# Patient Record
(demographics unavailable — no encounter records)

---

## 2017-07-02 NOTE — DIAGNOSTIC IMAGING REPORT
SINGLE VIEW CHEST



CLINICAL HISTORY:  Cough.



FINDINGS: An AP, portable, upright chest radiograph is compared to study dated

9/25/2014. The examination is degraded by portable technique and patient

rotation.  The cardiomediastinal silhouette is unremarkable. The lungs and

pleural spaces are clear. No pneumothorax is seen. The bony thorax is grossly

intact.



IMPRESSION: No active disease in the chest. 







Electronically signed by:  Apolinar Toure M.D.

7/2/2017 3:47 PM



Dictated Date/Time:  7/2/2017 3:46 PM

## 2017-07-02 NOTE — DIAGNOSTIC IMAGING REPORT
CT SCAN OF THE ABDOMEN AND PELVIS WITH IV CONTRAST



CLINICAL HISTORY:   Suprapubic abdominal pain.



COMPARISON STUDY:  Abdominal ultrasound dated 9/24/2008. Pelvic ultrasound dated

7/2/2017.



TECHNIQUE: Following the IV administration of  116 cc of Optiray 320, CT scan of

the abdomen and pelvis is performed from the lung bases to the proximal femora.

Images are reviewed in the axial, sagittal, and coronal planes. IV contrast was

administered without complication. Automated dose control exposure was utilized.

The examination is degraded by large body habitus, and by streak artifact from

the body wall abutting the CT gantry.



CT DOSE: 1195.64 mGy.cm



FINDINGS:



Lung bases: The heart is normal in size and without pericardial effusion. The

lung bases are clear. There is a tiny hiatal hernia.



Liver: The contrast-enhanced liver is enlarged, measuring 23.3 cm in length. The

liver demonstrates diffusely diminished attenuation consistent with hepatic

steatosis. There is no intrahepatic biliary ductal dilatation. The hepatic veins

and portal veins are patent.



Gallbladder: Unremarkable.



Spleen: The spleen is enlarged, measuring 14.2 cm in length.



Pancreas: Unremarkable.



Adrenal glands: A 1.4 cm left adrenal nodule likely represents an adenoma but

cannot be definitively characterized due to the presence of IV contrast. The

right adrenal gland is unremarkable.



Kidneys: The contrast enhanced kidneys are normal in size and without

hydronephrosis. The kidneys enhance symmetrically. A 3 mm nonobstructing left

renal calculus is noted.



Abdominal vasculature: The abdominal aorta is normal in course and caliber.



Bowel: The small bowel and colon are normal in course and caliber. Moderate

fecal retention is noted in the right colon. The appendix is  not identified and

reported surgically absent.



Peritoneum: There is no intraperitoneal free air or abdominal ascites. There is

a tiny fat-containing umbilical hernia.



Lymphadenopathy: None.



Pelvic viscera: The bladder, uterus, and adnexa are normal as visualized noting

an intrauterine device in place. There are small ovarian follicles. Calcified

phleboliths are observed in the pelvis.



Skeletal structures: No lytic or blastic lesions are seen. There is advanced

degenerative disc space narrowing at L5-S1 with a large posterior disc

osteophyte complex.





IMPRESSION:



1. There are no acute infectious or inflammatory findings in the abdomen or

pelvis.



2. Hepatomegaly and hepatic steatosis.



3. Splenomegaly.



4. Small nonobstructing left renal calculus.







Electronically signed by:  Apolinar Toure M.D.

7/2/2017 7:34 PM



Dictated Date/Time:  7/2/2017 7:28 PM

## 2017-07-02 NOTE — EMERGENCY ROOM VISIT NOTE
History


First contact with patient:  15:01


Chief Complaint:  URINARY SYMPTOMS


Stated Complaint:  BLADDER/VAGINAL PAIN,PRESSURE,LEAKAGE


Nursing Triage Summary:  


pt c/o bladder pressure and pain and vag dc





History of Present Illness


The patient is a 42 year old female who presents to the Emergency Room via 

private vehicle with complaints of "bladder/vaginal pain, pressure, leakage".  

The patient states that she has a history of obstetric and gynecological 

complications.  She states that for the past 7 years she has been on the Depo-

Provera shot, and unfortunately had been experiencing a good deal of 

breakthrough bleeding, atrophy and other complications.  She didn't follow up 

with her OB/GYN, and the decision was made to place the patient on Mirena.  

This was placed on 04/25/2017.  Since that time, the patient has been 

experiencing what she describes as urinary incontinence, as well as vaginal 

odor.  There is also vaginal pain.  She points to the inferior suprapubic 

region as the location of pain that she currently rates as a 7/10.  The pain, 

urinary incontinence, odor has been worsening.  She did have a checkup on June 13, and was told that she had a yeast infection and was placed upon Diflucan.  

She is been taking her Diflucan tablet every Wednesday since that time.  She 

feels as though it is not working.  She has not had any intercourse for one 

month.  She is an a monogamous relationship.  There was minimal blood when 

wiping after urination recently.  She denies any fevers, chills.  There is also 

associated minimal cough.  The patient states that today she drank a lot of 

water, and felt that she had to urinate a lot, and upon her arrival only 

urinated a small amount.





Review of Systems


A complete 10-point Review of Systems was discussed with the patient, with 

pertinent positives and negatives listed in the History of Present Illness. All 

remaining Review of Systems questions can be considered negative unless 

otherwise specified.





Past Medical/Surgical History


Medical Problems:


(1) Asthma


(2) Bipolar disorder








Family History


Diabetes, heart disease, high blood pressure, cancer, lung disease, gallbladder 

disease.





Social History


Smoking Status:  Never Smoker


Alcohol Use:  occasionally


Marital Status:  single, in relationship


Housing Status:  lives with family


Occupation Status:  unemployed





Current/Historical Medications


Scheduled


Budesonide/Formoterol Fumarate (Symbicort 160/4.5 Inhaler ), 2 PUFFS INH BID


Bupropion (Wellbutrin-Xl), 300 MG PO DAILY


Bupropion Hcl (Wellbutrin Xl), 1 TAB PO DAILY


Butalbital-Acetaminophen-Caffe (Fioricet), 1-2 CAP PO UD


Cefdinir (Omnicef), 1 CAP PO BID


Cholecalciferol (Vitamin D3), 2,000 UNITS PO DAILY


Cyanocobalamin (Vitamin B-12), 1,000 MCG PO DAILY


Diphenhydramine Hcl (Benadryl), 25 MG PO BID


Duloxetine HCl (Cymbalta), 30 MG PO QAM


Duloxetine Hcl (Cymbalta), 60 MG PO QPM


Fexofenadine-Pseudoephedrine (Allegra-D 24 Hour Allergy), 1 TAB PO QAM


Fluticasone Propionate (Nasal) (Flonase Allergy Relief), 2 SPRAYS LYNN DAILY


Hydroxyzine Hcl (Atarax), 25 MG PO TID


Labetalol Hcl (Labetalol Hcl), 200 MG PO BID


Levonorgestrel (Iud) (Mirena), 1 DOSE IU UD


Meloxicam (Mobic), 15 MG PO DAILY


Metformin HCl (Metformin HCl), 500 MG PO BID


Multivitamin (Multivitamin), 1 TAB PO QPM


Nystatin (Topical) (Nyata), 1 APPLN TOP TID


Pregabalin (Lyrica), 150 MG PO TID


Probiotic Product (Probiotic), PO QPM


Topiramate (Topamax), 300 MG PO BID


Triamcinolone Acet (Triamcinolone Acetonide), 1 APPLN TOP DAILY


Zafirlukast (Accolate), 20 MG PO BID





Scheduled PRN


Acetaminophen (Tylenol), 1,000 MG PO Q4-6 PRN for Pain


Albuterol Hfa (Ventolin Hfa), 2 PUFFS INH Q4 PRN for SOB/Wheezing


Cyclobenzaprine Hcl (Flexeril), 5 MG PO TID PRN for Pain


Olopatadine Hydrochloride (Pataday), 1 DROPS OP DAILY PRN for EYE ALLERGIES





Allergies


Coded Allergies:  


     Banana (Verified  Allergy, Unknown, 11/13/15)


     Celery (Verified  Allergy, Unknown, 11/13/15)


     Macrolides (Verified  Allergy, Unknown, BIAXIN, 11/13/15)


     NUTS (Verified  Allergy, Unknown, 11/13/15)


     Sulfa Antibiotics (Verified  Allergy, Unknown, `, 7/2/17)


     Amoxicillin (Verified  Adverse Reaction, Intermediate, SEVERE STOMACH PAIN

, 7/2/17)


     Doxycycline (Verified  Adverse Reaction, Intermediate, MIGRAINE, IMMEDIATE 

BODYACHE ESPECIALLY BAD IN JOINTS, 7/2/17)


     Clarithromycin (Verified  Adverse Reaction, Mild, GI-UPSET, 11/13/15)


     Levofloxacin (Verified  Adverse Reaction, Mild, MIGRAINE,BAD BODYACHES 

MUSCLE PAIN, 7/2/17)


     Cephalexin (Verified  Adverse Reaction, Unknown, DOESN'T REMEMBER, 7/2/17)


     Prednisone (Verified  Adverse Reaction, Unknown, HARDER TO CONTROL BIPOLAR 

SXS, 11/13/15)





Physical Exam


Vital Signs











  Date Time  Temp Pulse Resp B/P (MAP) Pulse Ox O2 Delivery O2 Flow Rate FiO2


 


7/2/17 21:00 36.9 68 10 124/70 98 Room Air  


 


7/2/17 19:06  79  125/76 98 Room Air  


 


7/2/17 17:39  80 12 121/81 99   


 


7/2/17 14:44 36.9 87 18 121/68 98 Room Air  











Physical Exam


VITAL SIGNS - Vital signs and nursing notes were reviewed.  Patient is afebrile

, normotensive, non-tachycardic and is saturating well on room air 98%.


GENERAL -42-year-old female appearing her stated age who is in no acute 

distress. Communicates well with provider and answers questions appropriately.


SKIN - Without rashes.


HEAD - NC/AT.


EYES - Sclera anicteric. Palpebral conjunctiva pink and moist with no injection 

noted.


LUNGS - Chest wall symmetric without accessory muscle use, intercostals 

retractions, or central cyanosis. Normal vesicular breath sounds CTA B/L. 

minimal wheezes bilaterally, without rales, or rhonchi appreciated.


CARDIAC - RRR with S1/S2. No murmur, rubs, or gallops appreciated. 


ABDOMEN - Abdominal contour without pulsations or visible masses. BS 

normoactive all four quadrants.  There is tenderness to palpation overlying the 

suprapubic region.  No CVA tenderness.  No palpable masses, hepatosplenomegaly, 

or ascites noted.


EXTREMITIES - No clubbing or peripheral cyanosis. No pretibial edema present. ++

5/5 strength noted in UE/LE bilaterally.








PELVIC EXAM: 


The patient's nurse was present to assist with exam, and chaperone.  The 

patient was educated upon what her pelvic exam was, and she was offered to 

decline.  Patient did not decline.  I explained to her the pelvic exam.  The 

patient was prepared and positioned for best examination.  Patient was 

positioned by nurse.  The external genitalia, mons pubis, labia majora, labia 

minora, clitoris, urethral meatus, Bartholin's glands, perineum, and anus were 

within normal limits. Small, 1mm distal, inferior introitis laceration/

abrasion. Minimal bleeding.  The speculum was held then a 45 angle and 

properly lubricated, the speculum was inserted without difficulty to the depth 

of the cervix.  Speculum was then open slowly.  Cervix was identified.  The 

cervix was within normal limits and did not display any purulent discharge nor 

was erythematous. Small black string protrudes which I presume to be the Mirena 

string. At this time 3 samples were taken of the discharge.  These were 

cultured.  The speculum was then closed and removed without difficulty.  I then 

explained to the patient that I was in a perform a bimanual pelvic examination.

  I then introduced the index finger into the vaginal vault, palpated the 

cervix and cervical os and noted no abnormalities.  The uterine body, apex and 

fundus were then palpated and were within normal limits, and position.  The 

ovaries were then palpated with my left hand pressing over the lower quadrants 

of the abdomen and my right index finger pressing in the region of the ovary 

with no abnormal findings but tenderness elicited.   The exam was concluded, 

the nurse felt the patient back to her bed.





Medical Decision & Procedures


ER Provider


Diagnostic Interpretation:


SINGLE VIEW CHEST





CLINICAL HISTORY:  Cough.





FINDINGS: An AP, portable, upright chest radiograph is compared to study dated


9/25/2014. The examination is degraded by portable technique and patient


rotation.  The cardiomediastinal silhouette is unremarkable. The lungs and


pleural spaces are clear. No pneumothorax is seen. The bony thorax is grossly


intact.





IMPRESSION: No active disease in the chest. 











Electronically signed by:  Apolinar Toure M.D.


7/2/2017 3:47 PM





Dictated Date/Time:  7/2/2017 3:46 PM





ULTRASOUND OF THE PELVIS 





CLINICAL HISTORY: Vaginal pain and discharge. Incontinence.





COMPARISON STUDY: MRI of the pelvis dated 1/16/2008.





TECHNIQUE: Real-time, grayscale, and color flow sonography of the pelvis is


performed both transabdominally and endovaginally. Images are reviewed in the


transverse and longitudinal planes.





FINDINGS:





Uterus: The uterus is normal in size and slightly heterogeneous and echotexture,


measuring 9.1 x 3.7 x 5.2 cm.





Endometrium: The endometrium is normal in appearance, and the endometrial stripe


is normal in thickness measuring up to 0.4 cm. An intrauterine device is in


place.





Ovaries: The ovaries are normal in size and morphology. The right ovary measures


2.7 x 1.7 x 2.0 cm and the left ovary measures 2.3 x 1.6 x 1.6 cm. There are


small bilateral ovarian follicles. Normal Doppler waveforms are shown within


both ovaries.





Pelvis: There is no free fluid in the cul-de-sac. No concerning adnexal lesion


is seen.








IMPRESSION: No acute sonographic abnormality is identified in the pelvis noting


an intrauterine device in place.











Electronically signed by:  Apolinar Toure M.D.


7/2/2017 5:49 PM





Dictated Date/Time:  7/2/2017 5:43 PM








CT SCAN OF THE ABDOMEN AND PELVIS WITH IV CONTRAST





CLINICAL HISTORY:   Suprapubic abdominal pain.





COMPARISON STUDY:  Abdominal ultrasound dated 9/24/2008. Pelvic ultrasound dated


7/2/2017.





TECHNIQUE: Following the IV administration of  116 cc of Optiray 320, CT scan of


the abdomen and pelvis is performed from the lung bases to the proximal femora.


Images are reviewed in the axial, sagittal, and coronal planes. IV contrast was


administered without complication. Automated dose control exposure was utilized.


The examination is degraded by large body habitus, and by streak artifact from


the body wall abutting the CT gantry.





CT DOSE: 1195.64 mGy.cm





FINDINGS:





Lung bases: The heart is normal in size and without pericardial effusion. The


lung bases are clear. There is a tiny hiatal hernia.





Liver: The contrast-enhanced liver is enlarged, measuring 23.3 cm in length. The


liver demonstrates diffusely diminished attenuation consistent with hepatic


steatosis. There is no intrahepatic biliary ductal dilatation. The hepatic veins


and portal veins are patent.





Gallbladder: Unremarkable.





Spleen: The spleen is enlarged, measuring 14.2 cm in length.





Pancreas: Unremarkable.





Adrenal glands: A 1.4 cm left adrenal nodule likely represents an adenoma but


cannot be definitively characterized due to the presence of IV contrast. The


right adrenal gland is unremarkable.





Kidneys: The contrast enhanced kidneys are normal in size and without


hydronephrosis. The kidneys enhance symmetrically. A 3 mm nonobstructing left


renal calculus is noted.





Abdominal vasculature: The abdominal aorta is normal in course and caliber.





Bowel: The small bowel and colon are normal in course and caliber. Moderate


fecal retention is noted in the right colon. The appendix is  not identified and


reported surgically absent.





Peritoneum: There is no intraperitoneal free air or abdominal ascites. There is


a tiny fat-containing umbilical hernia.





Lymphadenopathy: None.





Pelvic viscera: The bladder, uterus, and adnexa are normal as visualized noting


an intrauterine device in place. There are small ovarian follicles. Calcified


phleboliths are observed in the pelvis.





Skeletal structures: No lytic or blastic lesions are seen. There is advanced


degenerative disc space narrowing at L5-S1 with a large posterior disc


osteophyte complex.








IMPRESSION:





1. There are no acute infectious or inflammatory findings in the abdomen or


pelvis.





2. Hepatomegaly and hepatic steatosis.





3. Splenomegaly.





4. Small nonobstructing left renal calculus.











Electronically signed by:  Apolinar Toure M.D.


7/2/2017 7:34 PM





Dictated Date/Time:  7/2/2017 7:28 PM





Laboratory Results


7/2/17 15:27








Red Blood Count 4.57, Mean Corpuscular Volume 92.6, Mean Corpuscular Hemoglobin 

30.4, Mean Corpuscular Hemoglobin Concent 32.9, Mean Platelet Volume 9.9, 

Neutrophils (%) (Auto) 60.4, Lymphocytes (%) (Auto) 27.5, Monocytes (%) (Auto) 

7.0, Eosinophils (%) (Auto) 3.8, Basophils (%) (Auto) 0.8, Neutrophils # (Auto) 

5.24, Lymphocytes # (Auto) 2.39, Monocytes # (Auto) 0.61, Eosinophils # (Auto) 

0.33, Basophils # (Auto) 0.07





7/2/17 15:27

















Test


  7/2/17


14:50 7/2/17


15:27 7/2/17


16:00 7/2/17


20:05


 


Urine Pregnancy Test NEG (NEG)    


 


White Blood Count


  


  8.68 K/uL


(4.8-10.8) 


  


 


 


Red Blood Count


  


  4.57 M/uL


(4.2-5.4) 


  


 


 


Hemoglobin


  


  13.9 g/dL


(12.0-16.0) 


  


 


 


Hematocrit  42.3 % (37-47)   


 


Mean Corpuscular Volume


  


  92.6 fL


() 


  


 


 


Mean Corpuscular Hemoglobin


  


  30.4 pg


(25-34) 


  


 


 


Mean Corpuscular Hemoglobin


Concent 


  32.9 g/dl


(32-36) 


  


 


 


Platelet Count


  


  277 K/uL


(130-400) 


  


 


 


Mean Platelet Volume


  


  9.9 fL


(7.4-10.4) 


  


 


 


Neutrophils (%) (Auto)  60.4 %   


 


Lymphocytes (%) (Auto)  27.5 %   


 


Monocytes (%) (Auto)  7.0 %   


 


Eosinophils (%) (Auto)  3.8 %   


 


Basophils (%) (Auto)  0.8 %   


 


Neutrophils # (Auto)


  


  5.24 K/uL


(1.4-6.5) 


  


 


 


Lymphocytes # (Auto)


  


  2.39 K/uL


(1.2-3.4) 


  


 


 


Monocytes # (Auto)


  


  0.61 K/uL


(0.11-0.59) 


  


 


 


Eosinophils # (Auto)


  


  0.33 K/uL


(0-0.5) 


  


 


 


Basophils # (Auto)


  


  0.07 K/uL


(0-0.2) 


  


 


 


RDW Standard Deviation


  


  46.9 fL


(36.4-46.3) 


  


 


 


RDW Coefficient of Variation


  


  13.9 %


(11.5-14.5) 


  


 


 


Immature Granulocyte % (Auto)  0.5 %   


 


Immature Granulocyte # (Auto)


  


  0.04 K/uL


(0.00-0.02) 


  


 


 


Anion Gap


  


  10.0 mmol/L


(3-11) 


  


 


 


Est Creatinine Clear Calc


Drug Dose 


  94.6 ml/min 


  


  


 


 


Estimated GFR (


American) 


  81.5 


  


  


 


 


Estimated GFR (Non-


American 


  70.3 


  


  


 


 


BUN/Creatinine Ratio  16.5 (10-20)   


 


Calcium Level


  


  9.2 mg/dl


(8.5-10.1) 


  


 


 


Total Bilirubin


  


  0.3 mg/dl


(0.2-1) 


  


 


 


Aspartate Amino Transf


(AST/SGOT) 


  22 U/L (15-37) 


  


  


 


 


Alanine Aminotransferase


(ALT/SGPT) 


  45 U/L (12-78) 


  


  


 


 


Alkaline Phosphatase


  


  97 U/L


() 


  


 


 


Total Protein


  


  7.7 gm/dl


(6.4-8.2) 


  


 


 


Albumin


  


  3.7 gm/dl


(3.4-5.0) 


  


 


 


Globulin


  


  4.0 gm/dl


(2.5-4.0) 


  


 


 


Albumin/Globulin Ratio  0.9 (0.9-2)   


 


Urine Color    YELLOW 


 


Urine Appearance    CLEAR (CLEAR) 


 


Urine pH    5.5 (4.5-7.5) 


 


Urine Specific Gravity


  


  


  


  > 1.045


(1.000-1.030)


 


Urine Protein    NEG (NEG) 


 


Urine Glucose (UA)    NEG (NEG) 


 


Urine Ketones    NEG (NEG) 


 


Urine Occult Blood    NEG (NEG) 


 


Urine Nitrite    NEG (NEG) 


 


Urine Bilirubin    NEG (NEG) 


 


Urine Urobilinogen    NEG (NEG) 


 


Urine Leukocyte Esterase    SMALL (NEG) 


 


Urine WBC (Auto)    >30 /hpf (0-5) 


 


Urine RBC (Auto)    0-4 /hpf (0-4) 


 


Urine Hyaline Casts (Auto)    1-5 /lpf (0-5) 


 


Urine Epithelial Cells (Auto)


  


  


  


  10-20 /lpf


(0-5)


 


Urine Bacteria (Auto)    NEG (NEG) 














 Date/Time


Source Procedure


Growth Status


 


 


 7/2/17 16:00


Cervix Swab Trichomonas Preparation - Final Complete











Medications Administered











 Medications


  (Trade)  Dose


 Ordered  Sig/Scott


 Route  Start Time


 Stop Time Status Last Admin


Dose Admin


 


 Acetaminophen


  (Tylenol Tab)  500 mg  NOW  STAT


 PO  7/2/17 15:11


 7/2/17 15:15 DC 7/2/17 15:43


500 MG


 


 Cefdinir


  (Omnicef Cap)  300 mg  ONE  STAT


 PO  7/2/17 21:27


 7/2/17 21:28 DC 7/2/17 21:44


300 MG











Medical Decision


Patient was seen and evaluated as above.  After obtaining a thorough history 

and physical examination IV access was initiated and the above workup was 

performed.  The patient presents to us today with vaginal discharge, odor, 

urinary incontinence that is urged related, and a headache.  She has been on 

Diflucan every Wednesday since seeing her OB/GYN.  It appears that her symptoms 

at this time has stemmed after she had the intrauterine device placed.  Patient'

s CBC reveals no leukocytosis or anemia.  CMP reveals no evidence of kidney or 

liver failure.  Initial urine does reveal contaminates, however repeat urine 

does reveal small leukocyte esterase, greater than 30 white blood cells, 10-20 

epithelial cells.  I will treat this as a UTI after discussing the case with my 

attending.  Exam was performed and described as above.  Culture pending.  Chest 

x-ray was obtained as the patient did have a productive cough and was found to 

be negative.  Pelvic ultrasound was obtained and was found to be negative.  

Abdomen and pelvis CT was then obtained and found to be essentially negative 

except for internal findings.  Patient is a follow-up regarding these findings 

with her family doctor.  She is to follow up with urology regarding her urinary 

symptoms of incontinence, and OB/GYN for vaginal ailments.  She has what 

appears to be urge incontinence, and I do not suspect any neurologic failure.  

Her vital signs are stable.  She was given Tylenol for her pain, and then 

declined any other pain medication.  At this time she appears stable for 

discharge, and the case was discussed with my attending.  Patient declined pain 

medication for home.  Patient was educated upon worrisome symptoms which to 

return, had questions prior to discharge, and was discharged home in good 

condition.





At this time I believe the patient is stable for management in the outpatient 

setting, and do not suspect any emergent etiology of which would require 

hospitalization or surgery. 





In evaluation treatment this patient following differential diagnoses were 

entertained: UTI, STI, IUD infection, displacement, neurologic compromise, 

incontinence, diverticulitis, acute intra-abdominal injury, among others.





Impression





 Primary Impression:  


 Symptoms of urinary tract infection


 Additional Impressions:  


 Hepatic steatosis


 Splenomegaly


 Adrenal nodule


 Renal calculus


 umbilial hernia, fat containing


 Hiatal hernia





Departure Information


Dispostion


Home / Self-Care





Condition


GOOD





Prescriptions





Cefdinir (OMNICEF) 300 Mg Cap


1 CAP PO BID for 7 Days, #14 CAP


   Prov: [place] PA-C         7/2/17





Referrals


Cheo Cox M.D. (PCP)








Travis Vaca M.D.





Patient Instructions


My [facility]





Additional Instructions





You have been treated in the Emergency Department for a Urinary Tract Infection 

(UTI).





You have been prescribed Omnicef to be taken twice daily. This is an 

antibiotic. All antibiotics have the potential to cause diarrhea. Stop this 

medication and contact a medical provider if you were to develop any 

significant adverse side effects including: wheezing, shortness of breath, 

passing out, vomiting, or a diffuse rash. Always take antibiotics as directed 

and COMPLETE the ENTIRE course regardless of the improvement of your symptoms.





Please follow-up with your family doctor, your OB/GYN as well as a urologist.  

The urologist phone number has been listed.





For pain control, you can use the following over-the-counter medicines (if >13 yo):





- Regular strength (325mg/tab) Tylenol (acetaminophen) 2 tabs every 4-6 hours 

as needed. Do not exceed 12 tablets in a 24 hour period. Avoid taking more than 

3 grams (3000 mg) of Tylenol per day. This includes any other sources of 

acetaminophen you may take on a regular basis.





- Regular strength (200 mg/tab) Advil (ibuprofen) 1-2 tabs every 4-6 hours as 

needed. Do not exceed a dose of 3200 mg per day.





Return to the emergency department if your symptoms worsen despite treatment 

course outlined above.





Drink plenty of water and stay well hydrated.





As with any trip to the Emergency Department, you should follow-up with your 

Primary Care Provider from today's visit.





Return to the emergency department if your symptoms persist despite treatment 

plan outlined above or if the following symptoms occur: increased fevers, chills

, low back pain, nausea/vomiting, or blood in your urine.





Please return to emergency department with symptoms.





Problem Qualifiers

## 2017-07-02 NOTE — DIAGNOSTIC IMAGING REPORT
ULTRASOUND OF THE PELVIS 



CLINICAL HISTORY: Vaginal pain and discharge. Incontinence.



COMPARISON STUDY: MRI of the pelvis dated 1/16/2008.



TECHNIQUE: Real-time, grayscale, and color flow sonography of the pelvis is

performed both transabdominally and endovaginally. Images are reviewed in the

transverse and longitudinal planes.



FINDINGS:



Uterus: The uterus is normal in size and slightly heterogeneous and echotexture,

measuring 9.1 x 3.7 x 5.2 cm.



Endometrium: The endometrium is normal in appearance, and the endometrial stripe

is normal in thickness measuring up to 0.4 cm. An intrauterine device is in

place.



Ovaries: The ovaries are normal in size and morphology. The right ovary measures

2.7 x 1.7 x 2.0 cm and the left ovary measures 2.3 x 1.6 x 1.6 cm. There are

small bilateral ovarian follicles. Normal Doppler waveforms are shown within

both ovaries.



Pelvis: There is no free fluid in the cul-de-sac. No concerning adnexal lesion

is seen.





IMPRESSION: No acute sonographic abnormality is identified in the pelvis noting

an intrauterine device in place.







Electronically signed by:  Apolinar Toure M.D.

7/2/2017 5:49 PM



Dictated Date/Time:  7/2/2017 5:43 PM

## 2017-07-03 NOTE — EMERGENCY ROOM VISIT NOTE
ED Visit Note


First contact with patient:  15:01


I have personally seen and evaluated the patient with the PA.  I agree with the 

diagnosis and management decisions and have been personally involved in the 

case. Please see [place] PA-C's notes for further details of the history, 

physical and visit.

## 2017-08-16 NOTE — DIAGNOSTIC IMAGING REPORT
MRI OF THE BRAIN WITHOUT AND WITH IV CONTRAST



CLINICAL HISTORY: GAIT DISTURBANCE,MEMORY LOSS/IMPAIRMENT,HEADACHE    



COMPARISON STUDY:  August 30, 2006 



TECHNIQUE: MRI of the brain was performed from the vertex to the skull base

utilizing various T1 and T2 weighted sequences. Following the IV administration

of 11 mL of Gadavist contrast, additional enhanced images were obtained.



FINDINGS: 

Sagittal T1, axial diffusion, proton density and T2 weighted axial, coronal

FLAIR, and pre and post axial T1-weighted images were acquired. These were

supplemented with post gadolinium coronal T1 weighted images. 

No intra or extra-axial mass lesions are visualized.

Axial diffusion-weighted images reveal no evidence of acute or subacute

infarction.

There is no evidence of ventricular dilatation.

Proton density T2-weighted and FLAIR images reveal no significant

intraparenchymal signal abnormalities.

There are no abnormal flow voids.

There is no evidence of pathologic enhancement.



There are mild inflammatory changes in the right maxillary sinus and moderate

inflammatory changes within the left maxilla sinus.





IMPRESSION:  

1. Bilateral polypoid mucosal thickening within the maxillary sinuses left

greater than right.

2. Otherwise normal MRI of the brain. No evidence of acute or subacute

infarction. No evidence of intracranial mass. No evidence of hydrocephalus.







Electronically signed by:  Brian Valdovinos M.D.

8/16/2017 3:58 PM



Dictated Date/Time:  8/16/2017 3:55 PM

## 2017-08-16 NOTE — DIAGNOSTIC IMAGING REPORT
C-SPINE ROUTINE 4 OR 5 VIEWS



HISTORY: Mental status change  GAIT Disturbance, memory Loss/impairment,

headache



COMPARISON: None.



FINDINGS: The cervical spine is visualized from C1 through the superior endplate

of T1. There is no fracture.  No subluxation. Minimal degenerative disc change

Prevertebral soft tissues and the atlantodens interval are intact.



IMPRESSION:  

Minimal degenerative disc change. Otherwise negative study.











The above report was generated using voice recognition software.  It may contain

grammatical, syntax or spelling errors.







Electronically signed by:  Sanju Corbin M.D.

8/16/2017 3:55 PM



Dictated Date/Time:  8/16/2017 3:55 PM

## 2017-11-19 NOTE — EMERGENCY ROOM VISIT NOTE
History


Report prepared by [place]:  Sara Johnson


Under the Supervision of:  Dr. Josemanuel Cornejo D.O.


First contact with patient:  15:35


Chief Complaint:  RESPIRATORY PROBLEMS


Stated Complaint:  TROUBLE BREATHING, ASTHMATIC





History of Present Illness


The patient is a 42 year old female who presents to the Emergency Room with 

complaints of a worsening cough beginning two days ago. The patient reports her 

cough has been productive with a yellow mucus. She notes her lung hurt, fatigue

, sore throat, and achy muscles. The patient  used an inhaler and Carole-seltzer 

with no relief. The patient started working at a  2 weeks ago.





   Source of History:  patient


   Onset:  2 days ago


   Position:  other (generalized)


   Quality:  other (cough )


   Timing:  worsening


   Associated Symptoms:  + sorethroat, + cough, + fatigue





Review of Systems


See HPI for pertinent positives & negatives. A total of 10 systems reviewed and 

were otherwise negative.





Past Medical & Surgical


Medical Problems:


(1) Asthma


(2) Bipolar disorder








Social History


Smoking Status:  Never Smoker


Alcohol Use:  occasionally


Marital Status:  


Housing Status:  lives with family


Occupation Status:  disabled





Current/Historical Medications


Scheduled


Azithromycin (Zithromax Z-Nathaniel), 0 PO UD


Budesonide/Formoterol Fumarate (Symbicort 160/4.5 Inhaler ), 2 PUFFS INH BID


Bupropion HCl (Bupropion HCl Xl), 300 MG PO DAILY


Bupropion Hcl (Bupropion Hcl Xl), 150 MG PO DAILY


Butalbital-Acetaminophen-Caffe (Fioricet), 1-2 CAP PO UD


Cholecalciferol (Vitamin D3), 2,000 INTER.UNIT PO DAILY


Cyanocobalamin (Vitamin B-12), 1,000 MCG PO DAILY


Diphenhydramine Hcl (Benadryl), 25 MG PO BID


Duloxetine HCl (Duloxetine HCl), 30 MG PO QAM


Duloxetine HCl (Duloxetine HCl), 60 MG PO QAM


Fexofenadine-Pseudoephedrine (Allegra-D 24 Hour Allergy), 1 TAB PO QAM


Fluticasone Propionate (Fluticasone Propionate), 2 SPRAYS LYNN DAILY


Hydroxyzine Hcl (Atarax), 25 MG PO TID


Labetalol HCl (Labetalol HCl), 200 MG PO BID


Levonorgestrel (Iud) (Mirena), 20 MCG IU UD


Meloxicam (Meloxicam), 15 MG PO DAILY


Methylprednisolone (Medrol Dosepak), 1 PKT PO UD


Multivitamin (Multivitamin), 1 TAB PO QPM


Pregabalin (Lyrica), 150 MG PO TID


Probiotic Product (Probiotic), 1 TAB PO QPM


Ranitidine HCl (Ranitidine HCl), 150 MG PO BID


Topiramate (Topamax), 300 MG PO BID


Vitamin E (Vitamin E), 100 INTER.UNIT PO DAILY


Zafirlukast (Zafirlukast), 20 MG PO BID


Zolpidem Tartrate (Zolpidem Tartrate), 10 MG PO HS





Scheduled PRN


Acetaminophen (Tylenol), 1,000 MG PO Q4-6HRS PRN for Pain


Albuterol Hfa (Ventolin Hfa), 2 PUFFS INH Q4H PRN for SOB/Wheezing


Cyclobenzaprine HCl (Cyclobenzaprine HCl), 10 MG PO TID PRN for Muscle Spasm


Nystatin (Topical) (Nyata), 1 APPLN TOP TID PRN for UNDECIDED


Olopatadine HCl (Olopatadine Hydrochloride), 1 DROP OP DAILY PRN for Allergy 

Symptoms


Triamcinolone Acet (Triamcinolone Acetonide), 1 APPLN TOP DAILY PRN for 

UNDECIDED





Allergies


Coded Allergies:  


     Banana (Verified  Allergy, Unknown, 11/13/15)


     Celery (Verified  Allergy, Unknown, 11/13/15)


     Macrolides (Verified  Allergy, Unknown, BIAXIN, 11/13/15)


     NUTS (Verified  Allergy, Unknown, 11/13/15)


     Sulfa Antibiotics (Verified  Allergy, Unknown, `, 7/2/17)


     Amoxicillin (Verified  Adverse Reaction, Intermediate, SEVERE STOMACH PAIN

, 7/2/17)


     Doxycycline (Verified  Adverse Reaction, Intermediate, MIGRAINE, IMMEDIATE 

BODYACHE ESPECIALLY BAD IN JOINTS, 7/2/17)


     Clarithromycin (Verified  Adverse Reaction, Mild, GI-UPSET, 11/13/15)


     Levofloxacin (Verified  Adverse Reaction, Mild, MIGRAINE,BAD BODYACHES 

MUSCLE PAIN, 7/2/17)


     Cephalexin (Verified  Adverse Reaction, Unknown, DOESN'T REMEMBER, 7/2/17)


     Prednisone (Verified  Adverse Reaction, Unknown, HARDER TO CONTROL BIPOLAR 

SXS, 11/13/15)





Physical Exam


Vital Signs











  Date Time  Temp Pulse Resp B/P (MAP) Pulse Ox O2 Delivery O2 Flow Rate FiO2


 


11/19/17 15:54     100 Room Air  


 


11/19/17 15:34 37.0 90 16 128/87 97 Room Air  











Physical Exam


CONSTITUTIONAL/VITAL SIGNS: Reviewed / noted above.


GENERAL: Non-toxic in appearance. 


INTEGUMENTARY: Warm, dry, and Pink.


HEAD: Normocephalic.


EYES: without scleral icterus or trauma.


ENT/OROPHARYNX: hoarseness in voice. clear and moist.


LYMPHADENOPATHY/NECK: Is supple without lymphadenopathy or meningismus.


RESPIRATORY: Minimal scattered wheezes.


CARDIOVASCULAR: Regular rate and rhythm.


GI/ABDOMEN: Soft and nontender. No organomegaly or pulsatile mass. No rebound 

or guarding. Normal bowel sounds.


EXTREMITIES: Warm and well perfused.


BACK: No CVA tenderness.


NEUROLOGICAL: Intact without focal deficits. 


PSYCHIATRIC: normal affect.


MUSCULOSKELETAL: Normally developed with good muscle tone.





Medical Decision & Procedures


Medications Administered











 Medications


  (Trade)  Dose


 Ordered  Sig/Scott


 Route  Start Time


 Stop Time Status Last Admin


Dose Admin


 


 Albuterol/


 Ipratropium


  (Duoneb)  3 ml  NOW  STAT


 INH  11/19/17 15:44


 11/19/17 15:45 DC 11/19/17 15:44


3 ML











ED Course


1540: Previous medical records were reviewed. The patient was evaluated in room 

B8. A complete history and physical examination was performed.





1544: Ordered Duoneb 3 ml INH.





1555: On reevaluation, the patient is resting. I discussed the results and 

findings with the patient. She verbalized agreement of the treatment plan. The 

patient was discharged home.





Medical Decision





Differential includes viral illness, influenza, streptococcal pharyngitis, 

meningitis, pneumonia, sinusitis, UTI, pyelonephritis, otitis media.





This is a 42-year-old female who presents to the ED with a chief complaint of 

hoarseness in her voice, cough and muscle soreness.  She states that she has 

had the symptoms since Friday, 2 days ago.  The patient's vital signs here are 

stable.  She is afebrile.  Her exam reveals some scattered expiratory wheezing 

otherwise good air movement.  Posterior pharynx is clear.  The patient sounds 

hoarse.  The patient was treated with a DuoNeb treatment.  She was felt to be 

stable for discharge.  She will be discharged on Medrol Dosepak and Zithromax.





Blood Pressure Screening


Patient's blood pressure:  Normal blood pressure





Impression





 Primary Impression:  


 Bronchitis





Scribe Attestation


The scribe's documentation has been prepared under my direction and personally 

reviewed by me in its entirety. I confirm that the note above accurately 

reflects all work, treatment, procedures, and medical decision making performed 

by me.





Departure Information


Dispostion


Home / Self-Care





Prescriptions





Methylprednisolone (MEDROL DOSEPAK) 4 Mg Nathaniel


1 PKT PO UD for 6 Days, #1 PKT


   Prov: Josemanuel Cornejo D.O.         11/19/17 


Azithromycin (ZITHROMAX Z-NATHANIEL) 250 Mg Tab


0 PO UD, #1 PKT


   2 TABS DAY 1, THEN 1 TAB DAILY FOR 4 DAYS


   Prov: Josemanuel Cornejo D.O.         11/19/17





Referrals


Cheo Cox M.D. (PCP)





Forms


HOME CARE DOCUMENTATION FORM,                                                 

               IMPORTANT VISIT INFORMATION, WORK / SCHOOL INSTRUCTIONS





Patient Instructions


[facility]





Additional Instructions





Zithromax as prescribed.





Medrol Dosepak as prescribed.





Follow-up with your doctor for further care and evaluation in 1-2 days.  Return 

to the emergency department for worsening or new symptoms or any concerns.


You have been examined and treated today on an emergency basis only. This is 

not a substitute for, or an effort to provide, complete comprehensive medical 

care. It is impossible to recognize and treat all injuries or illnesses in a 

single emergency department visit. It is therefore important that you follow up 

closely with your doctor.  Call as soon as possible for an appointment.

## 2017-11-23 NOTE — EMERGENCY ROOM VISIT NOTE
History


First contact with patient:  14:15


Chief Complaint:  THROAT PAIN/INJURY


Stated Complaint:  NO VOICE,PAIN IN LUNGS,PRODUCTIVE COUGH





History of Present Illness


The patient is a 42 year old female who presents to the Emergency Room with 

complaints of a productive cough that has gotten progressively worse over the 

last week.  The patient was seen in the emergency department a few days ago.  

She was prescribed steroids and a Z-Nathaniel, which she has been taking with no 

relief.  She denies any fever or chills.  She feels extremely weak and mildly 

short of breath with exertion.  The patient has a history of asthma.  She 

reports not having any albuterol at home.  It is .





Review of Systems


10 system review performed and  negative unless noted in HPI or below





Past Medical/Surgical History


Medical Problems:


(1) Asthma


(2) Bipolar disorder








Social History


Smoking Status:  Never Smoker


Alcohol Use:  occasionally


Marital Status:  


Housing Status:  lives with family


Occupation Status:  disabled





Current/Historical Medications


Scheduled


Albuterol Sulf (Albuterol Sulfate), 1 AMP INH Q6H


Azithromycin (Zithromax Z-Nathaniel), 0 PO UD


Budesonide/Formoterol Fumarate (Symbicort 160/4.5 Inhaler ), 2 PUFFS INH BID


Bupropion HCl (Bupropion HCl Xl), 300 MG PO DAILY


Bupropion Hcl (Bupropion Hcl Xl), 150 MG PO DAILY


Butalbital-Acetaminophen-Caffe (Fioricet), 1-2 CAP PO UD


Cholecalciferol (Vitamin D3), 2,000 INTER.UNIT PO DAILY


Cyanocobalamin (Vitamin B-12), 1,000 MCG PO DAILY


Diphenhydramine Hcl (Benadryl), 25 MG PO BID


Duloxetine HCl (Duloxetine HCl), 30 MG PO QAM


Duloxetine HCl (Duloxetine HCl), 60 MG PO QAM


Fexofenadine-Pseudoephedrine (Allegra-D 24 Hour Allergy), 1 TAB PO QAM


Fluticasone Propionate (Fluticasone Propionate), 2 SPRAYS LYNN DAILY


Hydroxyzine Hcl (Atarax), 25 MG PO TID


Labetalol HCl (Labetalol HCl), 200 MG PO BID


Levofloxacin (Levaquin), 1 TAB PO DAILY


Levonorgestrel (Iud) (Mirena), 20 MCG IU UD


Meloxicam (Meloxicam), 15 MG PO DAILY


Methylprednisolone (Medrol Dosepak), 1 PKT PO UD


Multivitamin (Multivitamin), 1 TAB PO QPM


Prednisone (Prednisone), 0 PO DAILY


Pregabalin (Lyrica), 150 MG PO TID


Probiotic Product (Probiotic), 1 TAB PO QPM


Ranitidine HCl (Ranitidine HCl), 150 MG PO BID


Topiramate (Topamax), 300 MG PO BID


Vitamin E (Vitamin E), 100 INTER.UNIT PO DAILY


Zafirlukast (Zafirlukast), 20 MG PO BID


Zolpidem Tartrate (Zolpidem Tartrate), 10 MG PO HS





Scheduled PRN


Albuterol Hfa (Ventolin Hfa), 2 PUFFS INH Q4H PRN for SOB/Wheezing


Cyclobenzaprine HCl (Cyclobenzaprine HCl), 10 MG PO TID PRN for Muscle Spasm


Hydrocodone W/ Homatropine (Hycodan 5/1.5MG 5 Ml), 5-10 ML PO Q4H PRN for Cough


Nystatin (Topical) (Nyata), 1 APPLN TOP TID PRN for UNDECIDED


Olopatadine HCl (Olopatadine Hydrochloride), 1 DROP OP DAILY PRN for Allergy 

Symptoms


Triamcinolone Acet (Triamcinolone Acetonide), 1 APPLN TOP DAILY PRN for 

UNDECIDED





Physical Exam


Vital Signs











  Date Time  Temp Pulse Resp B/P (MAP) Pulse Ox O2 Delivery O2 Flow Rate FiO2


 


17 16:00  84 16 115/79 99 Nebulizer  


 


17 15:35  74 16  97 Room Air  


 


17 14:09 37.1 109 20 124/74 97 Room Air  











Physical Exam


VITALS: Vitals are noted on the nurse's note and reviewed by myself.  Vital 

signs stable.


GENERAL: 42-year-old female, in no acute distress, nondiaphoretic,


SKIN: The skin was without rashes, erythema, edema, or bruising.


HEAD: Normocephalic atraumatic.  


EARS: External auditory canals clear, tympanic membranes pearly gray without 

erythema or effusion bilaterally.


EYES: Conjunctivae without injection, sclerae without icterus.  Extraocular 

movements intact.  


MOUTH: Mucous membranes moist.  Tonsils are not enlarged.  Pharynx without 

erythema or exudate.  Uvula midline.  Airway patent.  Tongue does not deviate.  


NECK: Supple without nuchal rigidity.  Mild lymphadenopathy in the anterior 

cervical chain bilaterally.. Cervical spine is nontender.  No JVD.


HEART: Regular rate and rhythm without murmurs gallops or rubs.


LUNGS: Diffuse wheezing in all lung fields.  No crackles noted.  Positive 

tachypnea.  No accessory muscle use.


ABDOMEN: Positive bowel sounds x 4


MUSCULOSKELETAL: No muscle atrophy, erythema, or edema noted.  Strength 5/5 

throughout.


NEURO: Patient was alert and oriented to person place and time.  Normal 

sensation to touch. No focal neurological deficits.





Medical Decision & Procedures


ER Provider


Diagnostic Interpretation:


CXR





                                                                               

                                                                 


Patient Name: RABIA BE                               Unit Number:  

[id number]                  


 








 











Dictated: 17


 


Transcribed: 17


 


ARG


 


Printed Date/Time: [~ rep prt dt]/[~ rep prt tm]








 [~ rep ct labl] - [~ rep ct ivnm]


 





   [facility]


 Radiology Department


 [place] [id number] [phone]





 











Dictated: 17


 


Transcribed: 17


 


ARG


 


Printed Date/Time: [~ rep prt dt]/[~ rep prt tm]








 [~ rep ct labl] - [~ rep ct ivnm]


 





CHEST 2 VIEWS ROUTINE





CLINICAL HISTORY: Wheezing, productive cough, fatigue.    





COMPARISON STUDY:  2017





FINDINGS: The cardiac and mediastinal contours are normal. There is no evidence


of focal pulmonary consolidation. There is no evidence of failure. No pleural


effusions are visualized.[ 





IMPRESSION: No active disease in the chest.











Electronically signed by:  Brian Valdovinos M.D.


2017 3:23 PM





Dictated Date/Time:  2017 3:22 PM





 The status of this report is Signed.   


 Draft = Not yet reviewed or approved by Radiologist.  


 Signed = Reviewed and approved by Radiologist.


<AttendingPhy></AttendingPhy> <FamilyPhy>Cheo Cox M.D.</FamilyPhy> <

PrimaryPhy>Cheo Cox M.D.</PrimaryPhy> <UnitNumber>[id number]</UnitNumber

> <VisitNumber>[id number]</VisitNumber> <PatientName>RABIA BE</PatientName

> <DateOfBirth>1975</DateOfBirth> <Location>[facility]</Location> <ServiceDate>

17</ServiceDate> <MNE>ESINDI</MNE> <OrderingPhy>JomarChristi SONAL SOLORZANO</

OrderingPhy> <OrderingPhyMNE>f rep ord dr graham</OrderingPhyMNE> <DictatingPhyMNE>

f rep dict dr graham</DictatingPhyMNE> <CCListMNE>f rep ct mne</CCListMNE> <

AdmittingPhyMNE>f pt admit dr graham</AdmittingPhyMNE> <AttendingPhyMNE>f pt 

attend dr graham</AttendingPhyMNE>


<ConsultingPhyMNE>f pt consult dr graham</ConsultingPhyMNE> <FamilyPhyMNE>f pt fam 

dr graham</FamilyPhyMNE> <OtherPhyMNE>f pt other dr graham</OtherPhyMNE> <

PrimaryPhyMNE>f pt prim care dr graham</PrimaryPhyMNE> <ReferringPhyMNE>f pt 

referring dr graham</ReferringPhyMNE>





Laboratory Results


17 14:52








Red Blood Count 4.43, Mean Corpuscular Volume 92.8, Mean Corpuscular Hemoglobin 

30.7, Mean Corpuscular Hemoglobin Concent 33.1, Mean Platelet Volume 9.9, 

Neutrophils (%) (Auto) 71.9, Lymphocytes (%) (Auto) 19.5, Monocytes (%) (Auto) 

6.4, Eosinophils (%) (Auto) 1.1, Basophils (%) (Auto) 0.4, Neutrophils # (Auto) 

9.13, Lymphocytes # (Auto) 2.47, Monocytes # (Auto) 0.81, Eosinophils # (Auto) 

0.14, Basophils # (Auto) 0.05





17 14:52

















Test


  17


14:52 17


15:00


 


White Blood Count


  12.69 K/uL


(4.8-10.8) 


 


 


Red Blood Count


  4.43 M/uL


(4.2-5.4) 


 


 


Hemoglobin


  13.6 g/dL


(12.0-16.0) 


 


 


Hematocrit 41.1 % (37-47)  


 


Mean Corpuscular Volume


  92.8 fL


() 


 


 


Mean Corpuscular Hemoglobin


  30.7 pg


(25-34) 


 


 


Mean Corpuscular Hemoglobin


Concent 33.1 g/dl


(32-36) 


 


 


Platelet Count


  319 K/uL


(130-400) 


 


 


Mean Platelet Volume


  9.9 fL


(7.4-10.4) 


 


 


Neutrophils (%) (Auto) 71.9 %  


 


Lymphocytes (%) (Auto) 19.5 %  


 


Monocytes (%) (Auto) 6.4 %  


 


Eosinophils (%) (Auto) 1.1 %  


 


Basophils (%) (Auto) 0.4 %  


 


Neutrophils # (Auto)


  9.13 K/uL


(1.4-6.5) 


 


 


Lymphocytes # (Auto)


  2.47 K/uL


(1.2-3.4) 


 


 


Monocytes # (Auto)


  0.81 K/uL


(0.11-0.59) 


 


 


Eosinophils # (Auto)


  0.14 K/uL


(0-0.5) 


 


 


Basophils # (Auto)


  0.05 K/uL


(0-0.2) 


 


 


RDW Standard Deviation


  47.3 fL


(36.4-46.3) 


 


 


RDW Coefficient of Variation


  13.9 %


(11.5-14.5) 


 


 


Immature Granulocyte % (Auto) 0.7 %  


 


Immature Granulocyte # (Auto)


  0.09 K/uL


(0.00-0.02) 


 


 


Anion Gap


  8.0 mmol/L


(3-11) 


 


 


Est Creatinine Clear Calc


Drug Dose 89.2 ml/min 


  


 


 


Estimated GFR (


American) 76.7 


  


 


 


Estimated GFR (Non-


American 66.2 


  


 


 


BUN/Creatinine Ratio 15.5 (10-20)  


 


Calcium Level


  9.2 mg/dl


(8.5-10.1) 


 


 


Total Bilirubin


  0.2 mg/dl


(0.2-1) 


 


 


Aspartate Amino Transf


(AST/SGOT) 14 U/L (15-37) 


  


 


 


Alanine Aminotransferase


(ALT/SGPT) 38 U/L (12-78) 


  


 


 


Alkaline Phosphatase


  115 U/L


() 


 


 


Total Protein


  7.7 gm/dl


(6.4-8.2) 


 


 


Albumin


  3.4 gm/dl


(3.4-5.0) 


 


 


Globulin


  4.3 gm/dl


(2.5-4.0) 


 


 


Albumin/Globulin Ratio 0.8 (0.9-2)  


 


Influenza Type A (RT-PCR)


  


  Neg for Influ


A (NEG)


 


Influenza Type B (RT-PCR)


  


  Neg for Influ


B (NEG)











Medications Administered











 Medications


  (Trade)  Dose


 Ordered  Sig/Scott


 Route  Start Time


 Stop Time Status Last Admin


Dose Admin


 


 Albuterol/


 Ipratropium


  (Duoneb)  12 ml  ONE  ONCE


 INH  17 14:30


 17 14:35 DC 17 14:30


12 ML


 


 Methylprednisolone


 Sodium Succinate


  (Solu-Medrol IV)  125 mg  NOW  STAT


 IV  17 14:30


 17 14:35 DC 17 14:30


125 MG


 


 Levofloxacin


  (Levaquin Tab)  750 mg  STK-MED ONCE


 .ROUTE  17 14:50


 17 14:51 DC 17 14:50


750 MG











ED Course


Patient was seen and examined


Vital signs including  blood pressure were reviewed


medications list was verified with patient


Labs were obtained, and a saline lock was established


The patient was given an hour-long DuoNeb treatment.  She was given Solu-Medrol 

125 mg IV.


Imaging was performed


Upon reevaluation, the patient was resting comfortably.  We discussed the 

results of her workup.  She voiced understanding.


I reviewed discharge instructions the patient.  They voiced understanding and 

had no further questions.





Medical Decision


Differential diagnosis: Bronchitis, pneumonia, strep pharyngitis, viral 

pharyngitis, influenza , asthma exacerbation





This patient is a 42-year-old female presents emergency department with a 

productive cough and difficulty breathing.  Patient has a history of asthma.  

She is currently being treated with a Z-Nathaniel and steroids with no improvement.  

On exam, the patient was diffusely wheezing.  Her workup reveals mild 

leukocytosis.  There is no pneumonia noted on the x-ray.  The patient was given 

an hour-long DuoNeb treatment.  Breathing improved.  Her influenza swab is 

negative.  Although I believe this is likely viral, I will cover the patient 

with Levaquin to prevent pneumonia.  The patient also was recommended that she 

continue steroids and nebulizer treatments every 6 hours.  Patient is not 

hypoxic.  I believe she is stable to be discharged home with close follow-up.  

She was encouraged to see her primary care physician within the next 3 days for 

recheck.  She agrees to return to the emergency department with any new or 

concerning symptoms.





This chart was completed in part utilizing Dragon Speech Voice Recognition 

software. Attempts were made to minimize the grammatical errors, random word 

insertions, pronoun errors and incomplete sentences. Any formal questions or 

concerns about the content, text or information contained within the body of 

this dictation should be directly addressed to the provider for clarification.





Blood Pressure Screening


Patient's blood pressure:  Normal blood pressure





Impression





 Primary Impression:  


 Acute bronchitis





Departure Information


Dispostion


Home / Self-Care





Condition


FAIR





Prescriptions





Hydrocodone W/ Homatropine (HYCODAN 5/1.5MG 5 ML) 1 Syp Syp


5-10 ML PO Q4H Y for Cough, #200 ML


   Prov: Christi Hadley PA-C         17 


Levofloxacin (LEVAQUIN) 500 Mg Tab


1 TAB PO DAILY for 7 Days, #7 TAB


   Prov: Christi Hadley PA-C         17 


Albuterol Sulf (Albuterol Sulfate) 2.5 Mg/3 Ml Nebu


1 AMP INH Q6H for Shortness of Breath, #30 DOSE


   Prov: Christi Hadley PA-C         17 


Prednisone (Prednisone) 20 Mg Tab


0 PO DAILY, #18 TAB


   3 DAILY FOR 3 DAYS, THEN 2 DAILY FOR 3 DAYS, THEN 1 DAILY FOR 3 DAYS.


   Prov: Christi Hadley PA-C         17





Referrals


Pro,Cheo ZAVALA M.D. (PCP)





Patient Instructions


Bronchitis Acute Dc, My [facility]





Additional Instructions





You were evaluated in the emergency department for cough and difficulty 

breathing.  This is likely due to bronchitis





Please take entire course of steroids and antibiotics





Please take albuterol breathing treatments every 6 hours as needed for 

difficulty breathing.  I would recommend doing this scheduled every 6 hours for 

at least the next 24 hours.  Then this may be done on an as-needed basis.





Hycodan cough syrup may be taken every 6 hours as needed for cough.  Please do 

not drink alcohol or drive well taking this medication.





Please follow-up with her primary care physician within the next 3 days for 

follow-up.





Please do not hesitate to return to the emergency department with any new or 

concerning symptoms.

## 2017-11-23 NOTE — DIAGNOSTIC IMAGING REPORT
CHEST 2 VIEWS ROUTINE



CLINICAL HISTORY: Wheezing, productive cough, fatigue.    



COMPARISON STUDY:  July 2, 2017



FINDINGS: The cardiac and mediastinal contours are normal. There is no evidence

of focal pulmonary consolidation. There is no evidence of failure. No pleural

effusions are visualized.[ 



IMPRESSION: No active disease in the chest.







Electronically signed by:  Brian Valdovinos M.D.

11/23/2017 3:23 PM



Dictated Date/Time:  11/23/2017 3:22 PM